# Patient Record
Sex: MALE | Race: WHITE | NOT HISPANIC OR LATINO | Employment: FULL TIME | URBAN - METROPOLITAN AREA
[De-identification: names, ages, dates, MRNs, and addresses within clinical notes are randomized per-mention and may not be internally consistent; named-entity substitution may affect disease eponyms.]

---

## 2018-10-08 ENCOUNTER — HOSPITAL ENCOUNTER (OUTPATIENT)
Dept: RADIOLOGY | Facility: HOSPITAL | Age: 23
Discharge: HOME | End: 2018-10-08
Attending: ORTHOPAEDIC SURGERY

## 2018-10-08 VITALS
HEIGHT: 73 IN | DIASTOLIC BLOOD PRESSURE: 77 MMHG | BODY MASS INDEX: 22 KG/M2 | WEIGHT: 166 LBS | SYSTOLIC BLOOD PRESSURE: 126 MMHG | HEART RATE: 59 BPM

## 2018-10-08 DIAGNOSIS — M25.512 ACUTE PAIN OF LEFT SHOULDER: ICD-10-CM

## 2018-10-08 DIAGNOSIS — M25.812 SUBCORACOID IMPINGEMENT OF LEFT SHOULDER: Primary | ICD-10-CM

## 2018-10-08 PROBLEM — M25.811 SUBCORACOID IMPINGEMENT OF RIGHT SHOULDER: Status: ACTIVE | Noted: 2018-10-08

## 2018-10-08 PROCEDURE — 99203 OFFICE O/P NEW LOW 30 MIN: CPT | Performed by: ORTHOPAEDIC SURGERY

## 2018-10-08 NOTE — PROGRESS NOTES
Assessment  Diagnoses and all orders for this visit:    Subcoracoid impingement of left shoulder          Discussion and Plan:    The patient has an exam were consistent with subcoracoid impingement and will be sent to physical therapy where he will work on scapular muscular training and hopefully be able to return to his work out routine without pain  If he does continue to be symptomatic despite physical therapy then consideration to an injection and/or an MRI arthrogram will be provided, his exam today is not pointing towards distal clavicle osteolysis as his continued problem but that changes then we can always consider an acromioclavicular joint injection  Subjective:   Patient ID: Bee Jim is a 21 y o  male      Patient presents for evaluation of his left shoulder pain  He is an active 66-year-old gentleman who 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 and was treated with activity modification which did improve his symptoms  However when he has returned to working out he has developed symptoms again and this is limiting his ability to perform activities he wishes to perform including playing golf  He does not have significant pain at rest but when he does have pain he localizes to the subcoracoid region of his left shoulder  He does not localize pain to the acromioclavicular joint  He says the pain is dull and is intermittent timing, it is worse with working out and swinging the golf club, it is improved by rest   There is no numbness or tingling or fevers and chills associated with this chief complaint            The following portions of the patient's history were reviewed and updated as appropriate: allergies, current medications, past family history, past medical history, past social history, past surgical history and problem list     Review of Systems   Constitutional: Negative for chills and fever  HENT: Negative for hearing loss      Eyes: Negative for visual disturbance  Respiratory: Negative for shortness of breath  Cardiovascular: Negative for chest pain  Gastrointestinal: Negative for abdominal pain  Musculoskeletal:        As reviewed in the HPI   Skin: Negative for rash  Neurological:        As reviewed in the HPI   Psychiatric/Behavioral: Negative for agitation  Objective:  Left Shoulder Exam     Tenderness   The patient is experiencing tenderness in the Tender over subcoracoid bursa  Range of Motion   Passive Abduction:                    120  Forward Flexion:                        170  External Rotation:                      80  Internal Rotation 0 degrees:      Sacrum    Muscle Strength   Abduction:            5/5  Internal Rotation:  5/5  External Rotation: 5/5    Tests   Impingement:   Positive  Arteaga:          Positive  Cross Arm:      Negative  Drop Arm:        Negative  Apprehension: Negative    Comments:  Mildly positive [place] sign          Physical Exam   Constitutional: He is oriented to person, place, and time  He appears well-developed and well-nourished  HENT:   Head: Normocephalic and atraumatic  Neck: Normal range of motion  Neck supple  Cardiovascular: Normal rate and regular rhythm  Pulmonary/Chest: Effort normal  No respiratory distress  Abdominal: Soft  He exhibits no distension  Neurological: He is alert and oriented to person, place, and time  Skin: Skin is warm and dry  Psychiatric: He has a normal mood and affect  His behavior is normal    Nursing note and vitals reviewed  I have personally reviewed pertinent films in PACS and my interpretation is as follows      MRI right shoulder was reviewed shows distal clavicle osteolysis, this was performed in 2016

## 2018-10-16 ENCOUNTER — EVALUATION (OUTPATIENT)
Dept: PHYSICAL THERAPY | Facility: REHABILITATION | Age: 23
End: 2018-10-16
Payer: COMMERCIAL

## 2018-10-16 DIAGNOSIS — M25.812 SUBCORACOID IMPINGEMENT OF LEFT SHOULDER: ICD-10-CM

## 2018-10-16 PROCEDURE — G8991 OTHER PT/OT GOAL STATUS: HCPCS | Performed by: PHYSICAL THERAPIST

## 2018-10-16 PROCEDURE — 97161 PT EVAL LOW COMPLEX 20 MIN: CPT | Performed by: PHYSICAL THERAPIST

## 2018-10-16 PROCEDURE — 97110 THERAPEUTIC EXERCISES: CPT | Performed by: PHYSICAL THERAPIST

## 2018-10-16 PROCEDURE — G8990 OTHER PT/OT CURRENT STATUS: HCPCS | Performed by: PHYSICAL THERAPIST

## 2018-10-16 NOTE — PROGRESS NOTES
PT Evaluation     Today's date: 10/16/2018  Patient name: Wilfred Chilel  : 1995  MRN: [id number]  Referring provider: Layla Means MD  Dx:   Encounter Diagnosis     ICD-10-CM    1  Subcoracoid impingement of left shoulder M25 819 Ambulatory referral to Physical Therapy                  Assessment  Impairments: abnormal or restricted ROM, activity intolerance, impaired physical strength, lacks appropriate home exercise program, pain with function and scapular dyskinesis    Assessment details: Presents with left shoulder pain that began six months ago while weight lifting, he said he had the same issue two years ago, which an MRI was performed and had osteolysis of the clavicle  He has tried taking time off from working out his shoulder during this time  He had PT performed over the summer but for only four sessions and he didn't notice any reasonable improvement  He currently wants to get back in to weight lifting without any increase or exacerbation in pain  He reports all pain is local to the shoulder with no pain down the arm or numbness into the fingers  Goals  ST- demonstrate compliancy with HEP in 1 week  Decrease reported pain to 0/10 with lifting activities, within 2 weeks     LT-Improve FOTO score to specified value to improve perceived benefit of physical therapy, in 6 weeks   Return to normal gym activity with no exacerbation of pain, within 6 weeks       Plan  Patient would benefit from: skilled physical therapy  Referral necessary: No  Planned modality interventions: cryotherapy, TENS and thermotherapy: hydrocollator packs  Planned therapy interventions: ADL training, balance, balance/weight bearing training, gait training, manual therapy, joint mobilization, neuromuscular re-education, strengthening, stretching, therapeutic activities and therapeutic exercise  Frequency: 2x week  Duration in weeks: 8  Plan of Care beginning date: 10/16/2018  Plan of Care expiration date: 2018  Treatment plan discussed with: patient  Plan details: Physical therapy with focus on there ex and manual therapy to improve ability to complete tasks around the house and complete functional activities, use of modalities as needed           Subjective Evaluation    History of Present Illness  Date of onset: 3/14/2018  Mechanism of injury: Chronic onset   Pain  Current pain ratin  At best pain ratin  At worst pain ratin  Location: L shoulder   Quality: dull ache  Aggravating factors: lifting    Treatments  Previous treatment: physical therapy  Patient Goals  Patient goals for therapy: decreased pain and independence with ADLs/IADLs  Patient goal: return to weight lifting at the gym         Objective     General Comments     Shoulder Comments   Ttp deep in shoulder worse with ER     ROM  Shoulder flexion WFL B/L  Shoulder aduction WFL B/L  Shoulder ER WFL B/L   Shoulder IR WFL B/L     MMT  Shoulder shrug 5/5   Shoulder flexion 5/5   Shoulder abduction 5/5  Shoulder IR 5/5   Shoulder ER 5/5   Elbow flexion WFL  Elbow extension WFL    Special tests (-) rdz-andrew, neers, drop arm     Joint play normal with no increase in pain with posterior or caudal directed           Precautions: none    Daily Treatment Diary     Manual              Scapular ROM              Rhythmic stabilization on L                                                         Exercise Diary              Sleeper stretch             UBE             ER/IR w/ TB             Wall clocks             Shoulder rows/ extensions             IR/ER w/ TB in 90 abduction              TB PNF patterns                                                                                                                                                                                           Modalities

## 2018-10-18 ENCOUNTER — OFFICE VISIT (OUTPATIENT)
Dept: PHYSICAL THERAPY | Facility: REHABILITATION | Age: 23
End: 2018-10-18
Payer: COMMERCIAL

## 2018-10-18 DIAGNOSIS — M25.812 SUBCORACOID IMPINGEMENT OF LEFT SHOULDER: Primary | ICD-10-CM

## 2018-10-18 PROCEDURE — 97112 NEUROMUSCULAR REEDUCATION: CPT | Performed by: PHYSICAL THERAPIST

## 2018-10-18 PROCEDURE — 97140 MANUAL THERAPY 1/> REGIONS: CPT | Performed by: PHYSICAL THERAPIST

## 2018-10-18 NOTE — PROGRESS NOTES
Daily Note     Today's date: 10/18/2018  Patient name: Margi Narayanan  : 1995  MRN: [id number]  Referring provider: Arina Avalos MD  Dx:   Encounter Diagnosis     ICD-10-CM    1  Subcoracoid impingement of left shoulder M25 819                   Subjective: Reports that he completed his exercises at home but did have some pain on Tuesday night a      Objective: See treatment diary below    Precautions: none    Daily Treatment Diary     Manual  10/18            Scapular ROM  CW            Rhythmic stabilization on L  CW                                                       Exercise Diary  10/18            Sleeper stretch 3x30"            UBE 3/3             ER/IR w/ TB 2x20 BTB             Wall clocks 3x green             Shoulder rows/ extensions 2x15 BTB             IR/ER w/ TB in 90 abduction  nv            TB PNF patterns  20x 4-way GTB            B/L ER  BTB 15x2             scap punches  nv                                                                                                                                                               Modalities                                                             Assessment: Tolerated treatment well  Patient exhibited good technique with therapeutic exercises, did well with new exercises, had some discomfort and difficulty with PNF patterns from above, reduced to GTB  Able to sustain stabilization with RS  Plan: Continue per plan of care

## 2018-10-24 ENCOUNTER — OFFICE VISIT (OUTPATIENT)
Dept: PHYSICAL THERAPY | Facility: REHABILITATION | Age: 23
End: 2018-10-24
Payer: COMMERCIAL

## 2018-10-24 DIAGNOSIS — M25.812 SUBCORACOID IMPINGEMENT OF LEFT SHOULDER: Primary | ICD-10-CM

## 2018-10-24 PROCEDURE — 97530 THERAPEUTIC ACTIVITIES: CPT

## 2018-10-24 PROCEDURE — 97110 THERAPEUTIC EXERCISES: CPT

## 2018-10-24 PROCEDURE — 97112 NEUROMUSCULAR REEDUCATION: CPT

## 2018-10-24 NOTE — PROGRESS NOTES
Daily Note     Today's date: 10/24/2018  Patient name: Jacob Hamilton  : 1995  MRN: [id number]  Referring provider: Jose David Bowen MD  Dx:   Encounter Diagnosis     ICD-10-CM    1  Subcoracoid impingement of left shoulder M25 819         1:1 with PTA CR 5:45- 6:30  Subjective: Less pinching/stiffness with horizontal adduction  Objective: See treatment diary below    Precautions: none    Daily Treatment Diary     Manual  10/18 10/24           Scapular ROM  CW NP           Rhythmic stabilization on L  CW 30"x3                                                      Exercise Diary  10/18 10/24           Sleeper stretch 3x30" 30"x3           UBE 3/3  3'/3'           ER/IR w/ TB 2x20 BTB  BTB  2x20           Wall clocks 3x green  GTB  x3           Shoulder rows/ extensions 2x15 BTB  BTB  2x15           IR/ER w/ TB in 90 abduction  nv OTB  2x10 ea  TB PNF patterns  20x 4-way GTB GTB  2x10           B/L ER  BTB 15x2  NP           scap punches  nv 5#  5"x20                                                                                                                       Modalities                                   Assessment: Tolerated treatment well  Patient exhibited good technique with therapeutic exercises  Poor postural awareness requiring cues to avoid rounded/forward posture  Continue with RTC strengthening and scap stabilization as tolerated  Plan: Continue per plan of care

## 2018-10-29 ENCOUNTER — OFFICE VISIT (OUTPATIENT)
Dept: PHYSICAL THERAPY | Facility: REHABILITATION | Age: 23
End: 2018-10-29
Payer: COMMERCIAL

## 2018-10-29 DIAGNOSIS — M25.812 SUBCORACOID IMPINGEMENT OF LEFT SHOULDER: Primary | ICD-10-CM

## 2018-10-29 PROCEDURE — 97112 NEUROMUSCULAR REEDUCATION: CPT

## 2018-10-29 NOTE — PROGRESS NOTES
Daily Note     Today's date: 10/29/2018  Patient name: Bee Jim  : 1995  MRN: [id number]  Referring provider: Luc Puentes MD  Dx:   Encounter Diagnosis     ICD-10-CM    1  Subcoracoid impingement of left shoulder M25 819                   Subjective: Pt states he's been having increased discomfort when performing horizontal adduction motion across body with L arm, states he didn't previously have it  Pt states he is doing HEP 1x/day because he's concerned doing it more may aggravate shoulder  Objective: See treatment diary below      Precautions: none    Daily Treatment Diary     Manual  10/18 10/24 10/29          Scapular ROM  CW NP KP          Rhythmic stabilization on L  CW 30"x3 2x1'                                                     Exercise Diary  10/18 10/24 10/29          Sleeper stretch 3x30" 30"x3 3x30"          UBE 33  3'/3' 3/3          ER/IR w/ TB 2x20 BTB  BTB  2x20 BTB 2x20          Wall clocks 3x green  GTB  x3 GTB 3x          Shoulder rows/ extensions 2x15 BTB  BTB  2x15 BTB 2x15          IR/ER w/ TB in 90 abduction  nv OTB  2x10 ea  OTB 2x10ea          TB PNF patterns  20x 4-way GTB GTB  2x10 GTB 2x10          B/L ER  BTB 15x2  NP           scap punches  nv 5#  5"x20 20x5" 5#                                                                                                                      Modalities                                   Assessment: Tolerated treatment well  Patient would benefit from continued PT  Pt demonstrated good ROM with shoulder PROM, had no pain with AROM during exercises  Pt  able to complete all exercises with no increase in pain during or after session  Pt  1:1 with PTA for entirety  Plan: Continue per plan of care

## 2018-10-31 ENCOUNTER — OFFICE VISIT (OUTPATIENT)
Dept: PHYSICAL THERAPY | Facility: REHABILITATION | Age: 23
End: 2018-10-31
Payer: COMMERCIAL

## 2018-10-31 DIAGNOSIS — M25.812 SUBCORACOID IMPINGEMENT OF LEFT SHOULDER: Primary | ICD-10-CM

## 2018-10-31 PROCEDURE — 97140 MANUAL THERAPY 1/> REGIONS: CPT

## 2018-10-31 NOTE — PROGRESS NOTES
Daily Note     Today's date: 10/31/2018  Patient name: Radha Hassan  : 1995  MRN: [id number]  Referring provider: Evan Devries MD  Dx:   Encounter Diagnosis     ICD-10-CM    1  Subcoracoid impingement of left shoulder M25 819                   Subjective: Pt notes his shoulder is a little sore, but not painful      Objective: See treatment diary below      Precautions: none    Daily Treatment Diary     Manual  10/18 10/24 10/29 10/31         Scapular ROM  CW NP KP KP         Rhythmic stabilization on L  CW 30"x3 2x1' 2x1'                                                    Exercise Diary  10/18 10/24 10/29 10/31         Sleeper stretch 3x30" 30"x3 3x30" 3x30"         UBE 33  3'/3' 3/3 3/3         ER/IR w/ TB 2x20 BTB  BTB  2x20 BTB 2x20 BTB 2x20         Wall clocks 3x green  GTB  x3 GTB 3x GTB 3x         Shoulder rows/ extensions 2x15 BTB  BTB  2x15 BTB 2x15 BTB 2x15         IR/ER w/ TB in 90 abduction  nv OTB  2x10 ea  OTB 2x10ea 2x10ea OTB         TB PNF patterns  20x 4-way GTB GTB  2x10 GTB 2x10 GTB 2x15         B/L ER  BTB 15x2  NP           scap punches  nv 5#  5"x20 20x5" 5# 20x5" 5#                                                                                                                     Modalities                                 Assessment: Tolerated treatment well  Patient would benefit from continued PT  Pt able to increase reps on exercises without complaint  Pt  able to complete all exercises with no increase in pain during or after session  Pt 1:1 with PTA [id number], IEP for remainder  Plan: Continue per plan of care

## 2018-11-05 ENCOUNTER — OFFICE VISIT (OUTPATIENT)
Dept: PHYSICAL THERAPY | Facility: REHABILITATION | Age: 23
End: 2018-11-05
Payer: COMMERCIAL

## 2018-11-05 DIAGNOSIS — M25.812 SUBCORACOID IMPINGEMENT OF LEFT SHOULDER: Primary | ICD-10-CM

## 2018-11-05 PROCEDURE — 97140 MANUAL THERAPY 1/> REGIONS: CPT

## 2018-11-05 PROCEDURE — 97110 THERAPEUTIC EXERCISES: CPT

## 2018-11-05 PROCEDURE — 97112 NEUROMUSCULAR REEDUCATION: CPT

## 2018-11-05 NOTE — PROGRESS NOTES
Daily Note     Today's date: 2018  Patient name: Radha Hassan  : 1995  MRN: [id number]  Referring provider: Evan Devries MD  Dx:   Encounter Diagnosis     ICD-10-CM    1  Subcoracoid impingement of left shoulder M25 819                   Subjective: Pt reports that he was playing basketball this weekend, says he is sore today  Objective: See treatment diary below      Precautions: none    Daily Treatment Diary     Manual  10/18 10/24 10/29 10/31 11/5        Scapular ROM  CW NP KP KP KP        Rhythmic stabilization on L  CW 30"x3 2x1' 2x1' 2x1'                                                   Exercise Diary  10/18 10/24 10/29 10/31 11/5        Sleeper stretch 3x30" 30"x3 3x30" 3x30" 3x30"        UBE 3/3  3'/3' 3/3 3/3 3/3        ER/IR w/ TB 2x20 BTB  BTB  2x20 BTB 2x20 BTB 2x20 BTB 2x20        Wall clocks 3x green  GTB  x3 GTB 3x GTB 3x GTB 3x        Shoulder rows/ extensions 2x15 BTB  BTB  2x15 BTB 2x15 BTB 2x15 BTB 2x15        IR/ER w/ TB in 90 abduction  nv OTB  2x10 ea  OTB 2x10ea 2x10ea OTB 2x15 ea OTB        TB PNF patterns  20x 4-way GTB GTB  2x10 GTB 2x10 GTB 2x15 GTB 2x15        B/L ER  BTB 15x2  NP           scap punches  nv 5#  5"x20 20x5" 5# 20x5" 5# 20x5" 5#        Table shoulder taps     2x10 ea                                                                                                       Modalities                                 Assessment: Tolerated treatment well  Patient would benefit from continued PT  Pt able to progress exercises this session  Introduced table shoulder taps, patient responded well  Pt  able to complete all exercises with no increase in pain during or after session  Pt  1:1 with PTA for entirety  Plan: Continue per plan of care

## 2018-11-07 ENCOUNTER — OFFICE VISIT (OUTPATIENT)
Dept: PHYSICAL THERAPY | Facility: REHABILITATION | Age: 23
End: 2018-11-07
Payer: COMMERCIAL

## 2018-11-07 DIAGNOSIS — M25.812 SUBCORACOID IMPINGEMENT OF LEFT SHOULDER: Primary | ICD-10-CM

## 2018-11-07 PROCEDURE — 97140 MANUAL THERAPY 1/> REGIONS: CPT

## 2018-11-07 PROCEDURE — 97110 THERAPEUTIC EXERCISES: CPT

## 2018-11-07 PROCEDURE — 97112 NEUROMUSCULAR REEDUCATION: CPT

## 2018-11-07 NOTE — PROGRESS NOTES
Daily Note     Today's date: 2018  Patient name: Raúl Cain  : 1995  MRN: [id number]  Referring provider: Hakan Vera MD  Dx:   Encounter Diagnosis     ICD-10-CM    1  Subcoracoid impingement of left shoulder M25 819                   Subjective: Pt reports no change in status since last visit      Objective: See treatment diary below      Precautions: none    Daily Treatment Diary     Manual  10/18 10/24 10/29 10/31 11/5 11/7       Scapular ROM  CW NP KP KP KP KP       Rhythmic stabilization on L  CW 30"x3 2x1' 2x1' 2x1' 2x1'                                                  Exercise Diary  10/18 10/24 10/29 10/31 11/5 11/7       Sleeper stretch 3x30" 30"x3 3x30" 3x30" 3x30" 3x30"       UBE 33  3'/3' 3/3 3/3 3/3 3/3       ER/IR w/ TB 2x20 BTB  BTB  2x20 BTB 2x20 BTB 2x20 BTB 2x20 BTB 2x20       Wall clocks 3x green  GTB  x3 GTB 3x GTB 3x GTB 3x GTB 3x       Shoulder rows/ extensions 2x15 BTB  BTB  2x15 BTB 2x15 BTB 2x15 BTB 2x15 BTB 2x15       IR/ER w/ TB in 90 abduction  nv OTB  2x10 ea  OTB 2x10ea 2x10ea OTB 2x15 ea OTB 2x15 ea OTB       TB PNF patterns  20x 4-way GTB GTB  2x10 GTB 2x10 GTB 2x15 GTB 2x15 GTB 2x15       B/L ER  BTB 15x2  NP           scap punches  nv 5#  5"x20 20x5" 5# 20x5" 5# 20x5" 5# 20x5" 5#       Table shoulder taps     2x10 ea 2x10 ea                                                                                                      Modalities                               Assessment: Tolerated treatment well  Patient would benefit from continued PT  Pt showed improvement in ROM of L shoulder this session during PROM  Pt  able to complete all exercises with no increase in pain during or after session  Pt  1:1 with PTA for entirety  Plan: Continue per plan of care

## 2018-11-13 ENCOUNTER — OFFICE VISIT (OUTPATIENT)
Dept: PHYSICAL THERAPY | Facility: REHABILITATION | Age: 23
End: 2018-11-13
Payer: COMMERCIAL

## 2018-11-13 DIAGNOSIS — M25.812 SUBCORACOID IMPINGEMENT OF LEFT SHOULDER: Primary | ICD-10-CM

## 2018-11-13 PROCEDURE — 97112 NEUROMUSCULAR REEDUCATION: CPT | Performed by: PHYSICAL THERAPIST

## 2018-11-13 PROCEDURE — G8990 OTHER PT/OT CURRENT STATUS: HCPCS | Performed by: PHYSICAL THERAPIST

## 2018-11-13 PROCEDURE — 97110 THERAPEUTIC EXERCISES: CPT | Performed by: PHYSICAL THERAPIST

## 2018-11-13 PROCEDURE — G8991 OTHER PT/OT GOAL STATUS: HCPCS | Performed by: PHYSICAL THERAPIST

## 2018-11-13 NOTE — PROGRESS NOTES
Daily Note     Today's date: 2018  Patient name: May Rider  : 1995  MRN: [id number]  Referring provider: Clay Hernandez MD  Dx:   Encounter Diagnosis     ICD-10-CM    1  Subcoracoid impingement of left shoulder M25 819                   Subjective: Reports that he has some pain in his right shoulder       Objective: See treatment diary below    Precautions: none    Daily Treatment Diary     Manual  10/18 10/24 10/29 10/31 11/5 11/7 11/13      Scapular ROM  CW NP KP KP KP KP CW      Rhythmic stabilization on L  CW 30"x3 2x1' 2x1' 2x1' 2x1' 2x1'                                                 Exercise Diary  10/18 10/24 10/29 10/31 11/5 11/7 11/13      Sleeper stretch 3x30" 30"x3 3x30" 3x30" 3x30" 3x30" 3x30"      UBE 33  3'/3' 3/3 3/3 3/3 3/3 3/3      ER/IR w/ TB 2x20 BTB  BTB  2x20 BTB 2x20 BTB 2x20 BTB 2x20 BTB 2x20 BTB 2x20      Wall clocks 3x green  GTB  x3 GTB 3x GTB 3x GTB 3x GTB 3x GTB 3x      Shoulder rows/ extensions 2x15 BTB  BTB  2x15 BTB 2x15 BTB 2x15 BTB 2x15 BTB 2x15 BTB 2x15 MTB     IR/ER w/ TB in 90 abduction  nv OTB  2x10 ea  OTB 2x10ea 2x10ea OTB 2x15 ea OTB 2x15 ea OTB 2x15 ea OTB      TB PNF patterns  20x 4-way GTB GTB  2x10 GTB 2x10 GTB 2x15 GTB 2x15 GTB 2x15 GTB 2x15       B/L ER  BTB 15x2  NP           scap punches  nv 5#  5"x20 20x5" 5# 20x5" 5# 20x5" 5# 20x5" 5#       Table shoulder taps     2x10 ea 2x10 ea 2x10 ea                                                                                                     Modalities                                 Assessment: Tolerated treatment well  Patient would benefit from continued PT, did well with exercises today, continue to progress in resistance nv  Plan: Continue per plan of care

## 2018-11-15 ENCOUNTER — OFFICE VISIT (OUTPATIENT)
Dept: PHYSICAL THERAPY | Facility: REHABILITATION | Age: 23
End: 2018-11-15
Payer: COMMERCIAL

## 2018-11-15 DIAGNOSIS — M25.812 SUBCORACOID IMPINGEMENT OF LEFT SHOULDER: Primary | ICD-10-CM

## 2018-11-15 PROCEDURE — 97140 MANUAL THERAPY 1/> REGIONS: CPT

## 2018-11-15 PROCEDURE — 97110 THERAPEUTIC EXERCISES: CPT

## 2018-11-15 PROCEDURE — 97112 NEUROMUSCULAR REEDUCATION: CPT

## 2018-11-15 NOTE — PROGRESS NOTES
Daily Note     Today's date: 11/15/2018  Patient name: Jeniffer Ward  : 1995  MRN: [id number]  Referring provider: iRo Cid MD  Dx:   Encounter Diagnosis     ICD-10-CM    1  Subcoracoid impingement of left shoulder M25 819                   Subjective: Pt reports that he was doing increased activity (playing racquetball), and is sore in both shoulders, so he has started resting from HEP      Objective: See treatment diary below      Precautions: none    Daily Treatment Diary     Manual  10/18 10/24 10/29 10/31 11/5 11/7 11/13 11/15     Scapular ROM  CW NP KP KP KP KP CW KP     Rhythmic stabilization on L  CW 30"x3 2x1' 2x1' 2x1' 2x1' 2x1' 2x1'                                                Exercise Diary  10/18 10/24 10/29 10/31 11/5 11/7 11/13 11/15     Sleeper stretch 3x30" 30"x3 3x30" 3x30" 3x30" 3x30" 3x30" 3x30"     UBE 33  3'/3' 3/3 3/3 3/3 3/3 3/3 3/3     ER/IR w/ TB 2x20 BTB  BTB  2x20 BTB 2x20 BTB 2x20 BTB 2x20 BTB 2x20 BTB 2x20 BTB 2x20     Wall clocks 3x green  GTB  x3 GTB 3x GTB 3x GTB 3x GTB 3x GTB 3x GTB 3x     Shoulder rows/ extensions 2x15 BTB  BTB  2x15 BTB 2x15 BTB 2x15 BTB 2x15 BTB 2x15 BTB 2x15 BTB 2x15     IR/ER w/ TB in 90 abduction  nv OTB  2x10 ea  OTB 2x10ea 2x10ea OTB 2x15 ea OTB 2x15 ea OTB 2x15 ea OTB 2x15 ea OTB     TB PNF patterns  20x 4-way GTB GTB  2x10 GTB 2x10 GTB 2x15 GTB 2x15 GTB 2x15 GTB 2x15  GTB 2x15     B/L ER  BTB 15x2  NP           scap punches  nv 5#  5"x20 20x5" 5# 20x5" 5# 20x5" 5# 20x5" 5#  20x5" 5#     Table shoulder taps     2x10 ea 2x10 ea 2x10 ea 2x10 ea                                                                                                    Modalities                                 Assessment: Tolerated treatment well  Patient would benefit from continued PT  Pt showing improvement with ROM of left shoulder and scapula, improvement of L shoulder strength and tolerance to exercise    Pt  able to complete all exercises with no increase in pain during or after session  Pt  1:1 with PTA for entirety  Plan: Continue per plan of care

## 2018-11-19 ENCOUNTER — OFFICE VISIT (OUTPATIENT)
Dept: PHYSICAL THERAPY | Facility: REHABILITATION | Age: 23
End: 2018-11-19
Payer: COMMERCIAL

## 2018-11-19 DIAGNOSIS — M25.812 SUBCORACOID IMPINGEMENT OF LEFT SHOULDER: Primary | ICD-10-CM

## 2018-11-19 PROCEDURE — 97112 NEUROMUSCULAR REEDUCATION: CPT | Performed by: PHYSICAL THERAPIST

## 2018-11-19 PROCEDURE — 97110 THERAPEUTIC EXERCISES: CPT | Performed by: PHYSICAL THERAPIST

## 2018-11-19 NOTE — PROGRESS NOTES
Daily Note     Today's date: 2018  Patient name: Stella Chilel  : 1995  MRN: [id number]  Referring provider: Kathy Valladares MD  Dx:   Encounter Diagnosis     ICD-10-CM    1  Subcoracoid impingement of left shoulder M25 819                   Subjective: Reports he has some shoulder pain today after shoveling this weekend      Objective: See treatment diary below    Precautions: none    Daily Treatment Diary     Manual  10/31 11/5 11/7 11/13 11/15 11/19    Scapular ROM  KP KP KP CW KP CW    Rhythmic stabilization on L  2x1' 2x1' 2x1' 2x1' 2x1' CW                                      Exercise Diary  10/31 11/5 11/7 11/13 11/15 11/19    Sleeper stretch 3x30" 3x30" 3x30" 3x30" 3x30" 3x30"     UBE 3/3 3/3 3/3 3/3 3/3 3/3'    ER/IR w/ TB BTB 2x20 BTB 2x20 BTB 2x20 BTB 2x20 BTB 2x20 BTB 2x20     Wall clocks GTB 3x GTB 3x GTB 3x GTB 3x GTB 3x np    Shoulder rows/ extensions BTB 2x15 BTB 2x15 BTB 2x15 BTB 2x15 BTB 2x15 BTB 2x20     IR/ER w/ TB in 90 abduction  2x10ea OTB 2x15 ea OTB 2x15 ea OTB 2x15 ea OTB 2x15 ea OTB 2x10 GTB    TB PNF patterns  GTB 2x15 GTB 2x15 GTB 2x15 GTB 2x15  GTB 2x15 np    scap punches  20x5" 5# 20x5" 5# 20x5" 5#  20x5" 5# 20x5" 5#    Table shoulder taps  2x10 ea 2x10 ea 2x10 ea 2x10 ea 2x10 ea                                                                               Modalities                                   Assessment: Tolerated treatment well  Patient would benefit from continued PT, did well with today's exercise, able to progress in some TB exercises without complaint  Plan: Continue per plan of care

## 2018-11-21 ENCOUNTER — OFFICE VISIT (OUTPATIENT)
Dept: PHYSICAL THERAPY | Facility: REHABILITATION | Age: 23
End: 2018-11-21
Payer: COMMERCIAL

## 2018-11-21 DIAGNOSIS — M25.812 SUBCORACOID IMPINGEMENT OF LEFT SHOULDER: Primary | ICD-10-CM

## 2018-11-21 PROCEDURE — 97110 THERAPEUTIC EXERCISES: CPT | Performed by: PHYSICAL THERAPIST

## 2018-11-21 PROCEDURE — 97140 MANUAL THERAPY 1/> REGIONS: CPT | Performed by: PHYSICAL THERAPIST

## 2018-11-21 PROCEDURE — 97112 NEUROMUSCULAR REEDUCATION: CPT | Performed by: PHYSICAL THERAPIST

## 2018-11-21 NOTE — PROGRESS NOTES
Daily Note     Today's date: 2018  Patient name: Stella Chilel  : 1995  MRN: [id number]  Referring provider: Kathy Valladares MD  Dx:   Encounter Diagnosis     ICD-10-CM    1  Subcoracoid impingement of left shoulder M25 819                   Subjective: Reports that both of his shoulders feel pretty good today  Objective: See treatment diary below    Precautions: none    Daily Treatment Diary     Manual  10/31 11/5 11/7 11/13 11/15 11/19 11/21   Scapular ROM  KP KP KP CW KP CW CW   Rhythmic stabilization on L  2x1' 2x1' 2x1' 2x1' 2x1' CW [address]                                     Exercise Diary  10/31 11/5 11/7 11/13 11/15 11/19 11/21   Sleeper stretch 3x30" 3x30" 3x30" 3x30" 3x30" 3x30"  3x30"    UBE 3/3 3/3 33 3/3 3/3 3/3' 3/3'   ER/IR w/ TB BTB 2x20 BTB 2x20 BTB 2x20 BTB 2x20 BTB 2x20 BTB 2x20  BTB 2x20    Wall clocks GTB 3x GTB 3x GTB 3x GTB 3x GTB 3x np GTB 3x    Shoulder rows/ extensions BTB 2x15 BTB 2x15 BTB 2x15 BTB 2x15 BTB 2x15 BTB 2x20  BTB 2x20   IR/ER w/ TB in 90 abduction  2x10ea OTB 2x15 ea OTB 2x15 ea OTB 2x15 ea OTB 2x15 ea OTB 2x10 GTB 2x15 GTB   TB PNF patterns  GTB 2x15 GTB 2x15 GTB 2x15 GTB 2x15  GTB 2x15 np GTB 2x15   scap punches  20x5" 5# 20x5" 5# 20x5" 5#  20x5" 5# 20x5" 5# 2x15 5#   Table shoulder taps  2x10 ea 2x10 ea 2x10 ea 2x10 ea 2x10 ea  2x10 ea                                                                              Modalities                                     Assessment: Tolerated treatment well  Patient would benefit from continued PT, no complaints in today's exercises and demonstrated more stabilization in RS  Plan: Continue per plan of care

## 2018-11-27 ENCOUNTER — OFFICE VISIT (OUTPATIENT)
Dept: PHYSICAL THERAPY | Facility: REHABILITATION | Age: 23
End: 2018-11-27
Payer: COMMERCIAL

## 2018-11-27 DIAGNOSIS — M25.812 SUBCORACOID IMPINGEMENT OF LEFT SHOULDER: Primary | ICD-10-CM

## 2018-11-27 PROCEDURE — 97140 MANUAL THERAPY 1/> REGIONS: CPT | Performed by: PHYSICAL THERAPIST

## 2018-11-27 PROCEDURE — 97110 THERAPEUTIC EXERCISES: CPT | Performed by: PHYSICAL THERAPIST

## 2018-11-27 PROCEDURE — 97112 NEUROMUSCULAR REEDUCATION: CPT | Performed by: PHYSICAL THERAPIST

## 2018-11-27 NOTE — PROGRESS NOTES
Daily Note     Today's date: 2018  Patient name: Bee Jim  : 1995  MRN: [id number]  Referring provider: Luc Puentes MD  Dx:   Encounter Diagnosis     ICD-10-CM    1  Subcoracoid impingement of left shoulder M25 819                   Subjective: Reports that his shoulder "still doesn't feel quite right"      Objective: See treatment diary below    Precautions: none    Daily Treatment Diary     Manual  11/13 11/15 11/19 11/21 11/27   Scapular ROM  CW KP CW CW CW   Rhythmic stabilization on L  2x1' 2x1' CW CW [address]                               Exercise Diary  11/13 11/15 11/19 11/21 11/27   Sleeper stretch 3x30" 3x30" 3x30"  3x30"  3x30"   UBE 33 33 3/3' 3/3' 33'   ER/IR w/ TB BTB 2x20 BTB 2x20 BTB 2x20  BTB 2x20  TB 2x20   Wall clocks GTB 3x GTB 3x np GTB 3x  np   Shoulder rows/ extensions BTB 2x15 BTB 2x15 BTB 2x20  BTB 2x20 BTB 2x20   IR/ER w/ TB in 90 abduction  2x15 ea OTB 2x15 ea OTB 2x10 GTB 2x15 GTB 2x20 GTB    TB PNF patterns  GTB 2x15  GTB 2x15 np GTB 2x15 GTB 2x15   scap punches   20x5" 5# 20x5" 5# 2x15 5# np   Table shoulder taps 2x10 ea 2x10 ea 2x10 ea  2x10 ea  2x10   Quadruped scap pushes      2x10 5"                                                       Modalities                                     Assessment: Tolerated treatment well  Patient would benefit from continued PT, did well with new exercises today with no complaints of pain  Plan: Continue per plan of care

## 2018-11-29 ENCOUNTER — OFFICE VISIT (OUTPATIENT)
Dept: PHYSICAL THERAPY | Facility: REHABILITATION | Age: 23
End: 2018-11-29
Payer: COMMERCIAL

## 2018-11-29 DIAGNOSIS — M25.812 SUBCORACOID IMPINGEMENT OF LEFT SHOULDER: Primary | ICD-10-CM

## 2018-11-29 PROCEDURE — 97140 MANUAL THERAPY 1/> REGIONS: CPT | Performed by: PHYSICAL THERAPIST

## 2018-11-29 PROCEDURE — 97110 THERAPEUTIC EXERCISES: CPT | Performed by: PHYSICAL THERAPIST

## 2018-11-29 PROCEDURE — 97112 NEUROMUSCULAR REEDUCATION: CPT | Performed by: PHYSICAL THERAPIST

## 2018-11-29 NOTE — PROGRESS NOTES
Daily Note     Today's date: 2018  Patient name: Rose Orozco  : 1995  MRN: [id number]  Referring provider: Alyce Mckeon MD  Dx:   Encounter Diagnosis     ICD-10-CM    1  Subcoracoid impingement of left shoulder M25 819                   Subjective: Reports still having occasional soreness on the top of his shoulder  Objective: See treatment diary below    Precautions: none    Daily Treatment Diary     Manual  11/13 11/15 11/19 11/21 11/27 11/29   Scapular ROM  CW KP CW CW CW CW   Rhythmic stabilization on L  2x1' 2x1' CW CW CW [facility]                                  Exercise Diary  11/13 11/15 11/19 11/21 11/27 11/29   Sleeper stretch 3x30" 3x30" 3x30"  3x30"  3x30" np   UBE 3/3 3/3 3/3' 3/3' 3/3' 3/3'   ER/IR w/ TB BTB 2x20 BTB 2x20 BTB 2x20  BTB 2x20  BTB 2x20 BTB 2x20   Wall clocks GTB 3x GTB 3x np GTB 3x  np np   Shoulder rows/ extensions BTB 2x15 BTB 2x15 BTB 2x20  BTB 2x20 BTB 2x20 np   IR/ER w/ TB in 90 abduction  2x15 ea OTB 2x15 ea OTB 2x10 GTB 2x15 GTB 2x20 GTB  2x20 GTB   TB PNF patterns  GTB 2x15  GTB 2x15 np GTB 2x15 GTB 2x15 np   Table shoulder taps 2x10 ea 2x10 ea 2x10 ea  2x10 ea  2x10 3x10    Quadruped scap pushes      2x10 5" 2x10 5"   SL ER      3x10 5#   Prone horizontal abduction       nv   Prone rows      3x10 10#                                  Modalities                                 Assessment: Tolerated treatment well  Patient would benefit from continued PT, did well with exercises today, added more table based exercises focused on improving shoulder complex stabilization  Plan: Continue per plan of care

## 2018-12-03 ENCOUNTER — OFFICE VISIT (OUTPATIENT)
Dept: PHYSICAL THERAPY | Facility: REHABILITATION | Age: 23
End: 2018-12-03
Payer: COMMERCIAL

## 2018-12-03 DIAGNOSIS — M25.812 SUBCORACOID IMPINGEMENT OF LEFT SHOULDER: Primary | ICD-10-CM

## 2018-12-03 PROCEDURE — 97140 MANUAL THERAPY 1/> REGIONS: CPT

## 2018-12-03 PROCEDURE — 97110 THERAPEUTIC EXERCISES: CPT

## 2018-12-03 PROCEDURE — 97112 NEUROMUSCULAR REEDUCATION: CPT

## 2018-12-03 NOTE — PROGRESS NOTES
Daily Note     Today's date: 12/3/2018  Patient name: Julia Desir  : 1995  MRN: [id number]  Referring provider: Lazarus Hake, MD  Dx:   Encounter Diagnosis     ICD-10-CM    1  Subcoracoid impingement of left shoulder M25 819                   Subjective: Pt reports he was able to move furniture yesterday with no increase in pain or sx, bu still feels he's not improving overall      Objective: See treatment diary below      Precautions: none    Daily Treatment Diary     Manual  11/13 11/15 11/19 11/21 11/27 11/29 12/3   Scapular ROM  CW KP CW CW CW CW KP   Rhythmic stabilization on L  2x1' 2x1' CW CW CW [facility]                                     Exercise Diary  11/13 11/15 11/19 11/21 11/27 11/29 12/3   Sleeper stretch 3x30" 3x30" 3x30"  3x30"  3x30" np np   UBE 3/3 3/3 3/3' 3/3' 3/3' 3/3' 3/3   ER/IR w/ TB BTB 2x20 BTB 2x20 BTB 2x20  BTB 2x20  BTB 2x20 BTB 2x20 BTB 2x20ea   Wall clocks GTB 3x GTB 3x np GTB 3x  np np np   Shoulder rows/ extensions BTB 2x15 BTB 2x15 BTB 2x20  BTB 2x20 BTB 2x20 np np   IR/ER w/ TB in 90 abduction  2x15 ea OTB 2x15 ea OTB 2x10 GTB 2x15 GTB 2x20 GTB  2x20 GTB 2x20 GTB   TB PNF patterns  GTB 2x15  GTB 2x15 np GTB 2x15 GTB 2x15 np np   Table shoulder taps 2x10 ea 2x10 ea 2x10 ea  2x10 ea  2x10 3x10  3x10   Quadruped scap pushes      2x10 5" 2x10 5" 2x10 5"   SL ER      3x10 5# 3x10 5#   Prone horizontal abduction       nv 2x10 3#   Prone rows      3x10 10# 3x10 10#                                     Modalities                                 Assessment: Tolerated treatment well  Patient would benefit from continued PT  Pt is able to complete exercises with no increase in pain, able to add new exercises with no complaint  Pt is unable to state exactly where his pain is originating  Pt  1:1 with PTA for entirety  Plan: Continue per plan of care

## 2018-12-06 ENCOUNTER — OFFICE VISIT (OUTPATIENT)
Dept: PHYSICAL THERAPY | Facility: REHABILITATION | Age: 23
End: 2018-12-06
Payer: COMMERCIAL

## 2018-12-06 DIAGNOSIS — M25.812 SUBCORACOID IMPINGEMENT OF LEFT SHOULDER: Primary | ICD-10-CM

## 2018-12-06 PROCEDURE — 97140 MANUAL THERAPY 1/> REGIONS: CPT

## 2018-12-06 PROCEDURE — 97112 NEUROMUSCULAR REEDUCATION: CPT

## 2018-12-06 PROCEDURE — 97110 THERAPEUTIC EXERCISES: CPT

## 2018-12-06 NOTE — PROGRESS NOTES
Daily Note     Today's date: 2018  Patient name: Hailey Guevara  : 1995  MRN: [id number]  Referring provider: Carol Ruiz MD  Dx:   Encounter Diagnosis     ICD-10-CM    1  Subcoracoid impingement of left shoulder M25 819                   Subjective: Pt reports no change in status since LV      Objective: See treatment diary below      Precautions: none    Daily Treatment Diary     Manual  11/13 11/15 11/19 11/21 11/27 11/29 12/3 12/6   Scapular ROM  CW KP CW CW CW CW KP KP   Rhythmic stabilization on L  2x1' 2x1' CW CW CW CW KP KP   2nd rib JM        CW                             Exercise Diary  11/13 11/15 11/19 11/21 11/27 11/29 12/3 12/6   Sleeper stretch 3x30" 3x30" 3x30"  3x30"  3x30" np np np   UBE 3/3 33 3/3' 3/3' 3/3' 3/3' 3/3 3/3   ER/IR w/ TB BTB 2x20 BTB 2x20 BTB 2x20  BTB 2x20  BTB 2x20 BTB 2x20 BTB 2x20ea BTB 2x20 ea   Wall clocks GTB 3x GTB 3x np GTB 3x  np np np np   Shoulder rows/ extensions BTB 2x15 BTB 2x15 BTB 2x20  BTB 2x20 BTB 2x20 np np np   IR/ER w/ TB in 90 abduction  2x15 ea OTB 2x15 ea OTB 2x10 GTB 2x15 GTB 2x20 GTB  2x20 GTB 2x20 GTB 2x20 GTB   TB PNF patterns  GTB 2x15  GTB 2x15 np GTB 2x15 GTB 2x15 np np np   Table shoulder taps 2x10 ea 2x10 ea 2x10 ea  2x10 ea  2x10 3x10  3x10 3x10   Quadruped scap pushes      2x10 5" 2x10 5" 2x10 5" np   SL ER      3x10 5# 3x10 5# 3x10 5#   Prone horizontal abduction       nv 2x10 3# 2x10 3#   Prone rows      3x10 10# 3x10 10# 3x10 10#                                        Modalities                                   Assessment: Tolerated treatment well  Patient would benefit from continued PT   Pt  able to complete all exercises with no increase in pain during or after session  Pt  1:1 with PTA for entirety  Mobilizations performed by CW, PT  Plan: Continue per plan of care

## 2018-12-10 ENCOUNTER — TRANSCRIBE ORDERS (OUTPATIENT)
Dept: ADMINISTRATIVE | Facility: HOSPITAL | Age: 23
End: 2018-12-10

## 2018-12-10 VITALS
HEART RATE: 64 BPM | WEIGHT: 164 LBS | HEIGHT: 73 IN | SYSTOLIC BLOOD PRESSURE: 125 MMHG | BODY MASS INDEX: 21.74 KG/M2 | DIASTOLIC BLOOD PRESSURE: 78 MMHG

## 2018-12-10 DIAGNOSIS — M25.812 SUBCORACOID IMPINGEMENT OF LEFT SHOULDER: Primary | ICD-10-CM

## 2018-12-10 DIAGNOSIS — G89.29 CHRONIC LEFT SHOULDER PAIN: ICD-10-CM

## 2018-12-10 DIAGNOSIS — M25.512 CHRONIC LEFT SHOULDER PAIN: ICD-10-CM

## 2018-12-10 PROCEDURE — 99214 OFFICE O/P EST MOD 30 MIN: CPT | Performed by: ORTHOPAEDIC SURGERY

## 2018-12-10 NOTE — PROGRESS NOTES
Assessment  Diagnoses and all orders for this visit:    Subcoracoid impingement of left shoulder  -     MRI arthrogram left shoulder; Future  -     FL guided needle plac bx/asp/inj; Future    Chronic left shoulder pain        Discussion and Plan:    · Due to persistent symptoms after a 2 month course of formal physical therapy an MRI Arthrogram will be ordered of his LEFT shoulder to evaluate for any possible internal derangement or distal clavicle pathology  · He will follow up after MRI Arthrogram for further evaluation/treatment  Subjective:   Patient ID: Jason Rivera is a 21 y o  male      HPI  20 y/o male who presents today for a follow up visit for his LEFT shoulder  Patient states that he has been compliant with formal physical therapy for the past 2 months without any noticeable benefit of his symptoms  He has also been supplementing with a home exercise program    He continues to note pain mainly after exercising/lifting and on the anterior aspect of his shoulder but states that it is hard to "pinpoint" the main pain  Denies numbness and tingling  The following portions of the patient's history were reviewed and updated as appropriate: allergies, current medications, past family history, past medical history, past social history, past surgical history and problem list     Review of Systems   Constitutional: Negative for chills, fatigue, fever and unexpected weight change  HENT: Negative for hearing loss, nosebleeds and sore throat  Eyes: Negative for pain, redness and visual disturbance  Respiratory: Negative for cough, shortness of breath and wheezing  Cardiovascular: Negative for chest pain, palpitations and leg swelling  Gastrointestinal: Negative for abdominal pain, nausea and vomiting  Endocrine: Negative for polydipsia and polyuria  Genitourinary: Negative for frequency and urgency  Skin: Negative for color change, rash and wound     Neurological: Negative for dizziness, weakness, numbness and headaches  Psychiatric/Behavioral: Negative for behavioral problems, self-injury and suicidal ideas  Objective:  Left Shoulder Exam     Tenderness   The patient is experiencing tenderness in the [facility] joint, acromion  Range of Motion   Normal left shoulder ROM    Muscle Strength   Internal Rotation:  5/5  External Rotation: 5/5  Supraspinatus:     5/5    Tests   Impingement:   Positive  Arteaga:          Positive  Cross Arm:      Negative  Drop Arm:        Negative          Physical Exam   Constitutional: He is oriented to person, place, and time  He appears well-developed and well-nourished  No distress  Eyes: Pupils are equal, round, and reactive to light  Conjunctivae are normal    Neck: Normal range of motion  Neck supple  Cardiovascular: Normal rate, regular rhythm and intact distal pulses  Pulmonary/Chest: Effort normal and breath sounds normal    Abdominal: Soft  Bowel sounds are normal    Neurological: He is alert and oriented to person, place, and time  He has normal reflexes  Skin: Skin is warm and dry  No rash noted  No erythema  Psychiatric: He has a normal mood and affect   His behavior is normal        Scribe Attestation    I,:   Chantal Knowles am acting as a scribe while in the presence of the attending physician :        I,:   Allan Walters MD personally performed the services described in this documentation    as scribed in my presence :

## 2018-12-11 ENCOUNTER — OFFICE VISIT (OUTPATIENT)
Dept: PHYSICAL THERAPY | Facility: REHABILITATION | Age: 23
End: 2018-12-11
Payer: COMMERCIAL

## 2018-12-11 DIAGNOSIS — M25.812 SUBCORACOID IMPINGEMENT OF LEFT SHOULDER: Primary | ICD-10-CM

## 2018-12-11 PROCEDURE — 97140 MANUAL THERAPY 1/> REGIONS: CPT | Performed by: PHYSICAL THERAPIST

## 2018-12-11 PROCEDURE — 97112 NEUROMUSCULAR REEDUCATION: CPT | Performed by: PHYSICAL THERAPIST

## 2018-12-11 PROCEDURE — 97110 THERAPEUTIC EXERCISES: CPT | Performed by: PHYSICAL THERAPIST

## 2018-12-11 NOTE — PROGRESS NOTES
Daily Note     Today's date: 2018  Patient name: Prasanna Gardiner  : 1995  MRN: [id number]  Referring provider: Calin Vergara MD  Dx:   Encounter Diagnosis     ICD-10-CM    1  Subcoracoid impingement of left shoulder M25 819                   Subjective: Went to the doctor and he will be getting an MRI done next week  Objective: See treatment diary below    Precautions: none    Daily Treatment Diary     Manual  11/21 11/27 11/29 12/3 12/6 12/11   Scapular ROM  CW CW CW KP KP np   Rhythmic stabilization on L  CW CW CW KP KP np   2nd rib JM     CW CW   L shoulder JM (post)      CW                Exercise Diary  11/21 11/27 11/29 12/3 12/6 12/11   UBE 3/3' 33' 33' 3/3 3/3 3/3'   ER/IR w/ TB BTB 2x20  BTB 2x20 BTB 2x20 BTB 2x20ea BTB 2x20 ea BTB 2x20 ea    IR/ER w/ TB in 90 abduction  2x15 GTB 2x20 GTB  2x20 GTB 2x20 GTB 2x20 GTB 2x20 BTB   TB PNF patterns  GTB 2x15 GTB 2x15 np np np GTB 2x20    Table shoulder taps 2x10 ea  2x10 3x10  3x10 3x10 3x10   Quadruped scap pushes   2x10 5" 2x10 5" 2x10 5" np 2x10 5"   SL ER   3x10 5# 3x10 5# 3x10 5# 3x10 5#   Prone horizontal abduction    nv 2x10 3# 2x10 3# 3x10 3#   Prone rows   3x10 10# 3x10 10# 3x10 10# 3x10 10#                                  Modalities                                       Assessment: Tolerated treatment well  Patient would benefit from continued PT, did well with exercises today, and reported no pain with new joint mobilizations  Plan: Continue per plan of care

## 2018-12-13 ENCOUNTER — OFFICE VISIT (OUTPATIENT)
Dept: PHYSICAL THERAPY | Facility: REHABILITATION | Age: 23
End: 2018-12-13
Payer: COMMERCIAL

## 2018-12-13 DIAGNOSIS — M25.812 SUBCORACOID IMPINGEMENT OF LEFT SHOULDER: Primary | ICD-10-CM

## 2018-12-13 PROCEDURE — 97110 THERAPEUTIC EXERCISES: CPT

## 2018-12-13 PROCEDURE — G8990 OTHER PT/OT CURRENT STATUS: HCPCS

## 2018-12-13 PROCEDURE — 97112 NEUROMUSCULAR REEDUCATION: CPT

## 2018-12-13 PROCEDURE — 97140 MANUAL THERAPY 1/> REGIONS: CPT

## 2018-12-13 PROCEDURE — G8991 OTHER PT/OT GOAL STATUS: HCPCS

## 2018-12-13 NOTE — PROGRESS NOTES
Daily Note     Today's date: 2018  Patient name: Paulino Culver  : 1995  MRN: [id number]  Referring provider: Cathie Bunn MD  Dx:   Encounter Diagnosis     ICD-10-CM    1  Subcoracoid impingement of left shoulder M25 819                   Subjective: Pt reports no change in status since last visit, this is his last visit prior to MRI  Objective: See treatment diary below      Precautions: none    Daily Treatment Diary     Manual  11/21 11/27 11/29 12/3 12/6 12/11 12/13   Scapular ROM  CW CW CW KP KP np KP   Rhythmic stabilization on L  CW CW CW KP KP np KP   2nd rib JM     CW CW    L shoulder JM (post)      CW                  Exercise Diary  11/21 11/27 11/29 12/3 12/6 12/11 12/13   UBE 3/3' 3/3' 3/3' 3/3 3/3 3/3' 3/3   ER/IR w/ TB BTB 2x20  BTB 2x20 BTB 2x20 BTB 2x20ea BTB 2x20 ea BTB 2x20 ea  BTB 2x20 ea   IR/ER w/ TB in 90 abduction  2x15 GTB 2x20 GTB  2x20 GTB 2x20 GTB 2x20 GTB 2x20 BTB 2x20 BTB   TB PNF patterns  GTB 2x15 GTB 2x15 np np np GTB 2x20  GTB 2x20   Table shoulder taps 2x10 ea  2x10 3x10  3x10 3x10 3x10 3x10   Quadruped scap pushes   2x10 5" 2x10 5" 2x10 5" np 2x10 5" 2x10 5"   SL ER   3x10 5# 3x10 5# 3x10 5# 3x10 5# np   Prone horizontal abduction    nv 2x10 3# 2x10 3# 3x10 3# 3x10 3#   Prone rows   3x10 10# 3x10 10# 3x10 10# 3x10 10# 3x10 10#                                     Modalities                                   Assessment: Tolerated treatment well  Patient would benefit from continued PT   Pt  able to complete all exercises with no increase in pain during or after session  Pt  1:1 with PTA for entirety  Pt may resume PT after MRI depending on results  Plan: Potential hold until results of MRI received

## 2018-12-19 ENCOUNTER — HOSPITAL ENCOUNTER (OUTPATIENT)
Dept: MRI IMAGING | Facility: HOSPITAL | Age: 23
Discharge: HOME | End: 2018-12-19
Attending: ORTHOPAEDIC SURGERY
Payer: COMMERCIAL

## 2018-12-19 ENCOUNTER — HOSPITAL ENCOUNTER (OUTPATIENT)
Dept: RADIOLOGY | Facility: HOSPITAL | Age: 23
Discharge: HOME | End: 2018-12-19
Attending: ORTHOPAEDIC SURGERY | Admitting: RADIOLOGY
Payer: COMMERCIAL

## 2018-12-19 DIAGNOSIS — M25.812 SUBCORACOID IMPINGEMENT OF LEFT SHOULDER: ICD-10-CM

## 2018-12-19 PROCEDURE — 77002 NEEDLE LOCALIZATION BY XRAY: CPT

## 2018-12-19 PROCEDURE — 73222 MRI JOINT UPR EXTREM W/DYE: CPT

## 2018-12-19 PROCEDURE — A9585 GADOBUTROL INJECTION: HCPCS | Performed by: ORTHOPAEDIC SURGERY

## 2018-12-19 PROCEDURE — 23350 INJECTION FOR SHOULDER X-RAY: CPT

## 2018-12-19 RX ORDER — LIDOCAINE HYDROCHLORIDE 10 MG/ML
7 INJECTION, SOLUTION INFILTRATION; PERINEURAL ONCE
Status: DISCONTINUED | OUTPATIENT
Start: 2018-12-19 | End: 2018-12-20 | Stop reason: HOSPADM

## 2018-12-19 RX ADMIN — IOHEXOL 50 ML: 300 INJECTION, SOLUTION INTRAVENOUS at 13:59

## 2018-12-19 RX ADMIN — GADOBUTROL 0.2 ML: 604.72 INJECTION INTRAVENOUS at 14:27

## 2018-12-20 ENCOUNTER — OFFICE VISIT (OUTPATIENT)
Dept: OBGYN CLINIC | Facility: OTHER | Age: 23
End: 2018-12-20
Payer: COMMERCIAL

## 2018-12-20 VITALS
SYSTOLIC BLOOD PRESSURE: 122 MMHG | BODY MASS INDEX: 21.74 KG/M2 | DIASTOLIC BLOOD PRESSURE: 74 MMHG | HEART RATE: 64 BPM | WEIGHT: 164 LBS | HEIGHT: 73 IN

## 2018-12-20 DIAGNOSIS — G89.29 CHRONIC LEFT SHOULDER PAIN: Primary | ICD-10-CM

## 2018-12-20 DIAGNOSIS — M25.512 CHRONIC LEFT SHOULDER PAIN: Primary | ICD-10-CM

## 2018-12-20 PROCEDURE — 99213 OFFICE O/P EST LOW 20 MIN: CPT | Performed by: ORTHOPAEDIC SURGERY

## 2018-12-20 NOTE — PROGRESS NOTES
Assessment:       1  Chronic left shoulder pain          Plan:        The patient was seen by Dr Phillip Zuluaga and myself  After was review of his recent MRI we discussed the benefits of activity modification and doing PT exercises  We discussed that steroid injections are not recommended at this time because of his MRI findings and due to his age  Follow-up will be as needed if his symptoms persist or worsen  Subjective:     Patient ID: Leander Grant is a 21 y o  male  Chief Complaint:    LILLIANA Sharp presents to the office for a follow-up evaluation following MRI scan  He reports his pain remains unchanged since his last visit  He states that certain motions, like weight lifting and turning the steering wheel, causes discomfort  He denies any new onset symptoms  Social History     Occupational History    Not on file  Social History Main Topics    Smoking status: Never Smoker    Smokeless tobacco: Never Used    Alcohol use Yes    Drug use: Unknown    Sexual activity: Not on file      Review of Systems   Constitutional: Negative  Respiratory: Negative  Musculoskeletal: Positive for arthralgias and myalgias  Skin: Negative  Neurological: Negative  Psychiatric/Behavioral: Negative  Objective:     Left Shoulder Exam     Tenderness   The patient is experiencing no tenderness  Range of Motion   The patient has normal left shoulder ROM  Muscle Strength   The patient has normal left shoulder strength  Tests   Apprehension: negative  Cross Arm: negative  Drop Arm: negative  Impingement: positive  Sulcus: absent    Other   Erythema: absent  Scars: absent  Sensation: normal  Pulse: present           Neurological Testing     Additional Neurological Details  Motor and sensation grossly intact  Physical Exam   Constitutional: He is oriented to person, place, and time  He appears well-developed and well-nourished  HENT:   Head: Normocephalic  Cardiovascular: Intact distal pulses  Pulmonary/Chest: Effort normal    Neurological: He is alert and oriented to person, place, and time  Motor and sensation grossly intact  Skin: Skin is warm and dry  Psychiatric: He has a normal mood and affect  His behavior is normal          I have personally reviewed pertinent films in PACS and my interpretation is There is no structural damage of note  There Is no sign of tendon or ligament tear  Glenohumeral joint alignment is appropriate  Jason Side